# Patient Record
Sex: MALE | Race: WHITE | Employment: OTHER | URBAN - NONMETROPOLITAN AREA
[De-identification: names, ages, dates, MRNs, and addresses within clinical notes are randomized per-mention and may not be internally consistent; named-entity substitution may affect disease eponyms.]

---

## 2017-01-16 ENCOUNTER — HOSPITAL ENCOUNTER (OUTPATIENT)
Dept: PAIN MANAGEMENT | Age: 82
Discharge: HOME | End: 2017-01-16
Payer: COMMERCIAL

## 2017-01-16 DIAGNOSIS — M51.16 LUMBAR DISC DISEASE WITH RADICULOPATHY: ICD-10-CM

## 2017-01-16 PROCEDURE — 62368 ANALYZE SP INF PUMP W/REPROG: CPT

## 2017-02-01 RX ORDER — CARVEDILOL 6.25 MG/1
6.25 TABLET ORAL 2 TIMES DAILY WITH MEALS
Qty: 60 TABLET | Refills: 11 | Status: SHIPPED | OUTPATIENT
Start: 2017-02-01 | End: 2018-02-01

## 2017-02-22 ENCOUNTER — HOSPITAL ENCOUNTER (OUTPATIENT)
Dept: PAIN MANAGEMENT | Age: 82
Discharge: HOME | End: 2017-02-22
Payer: COMMERCIAL

## 2017-02-22 DIAGNOSIS — M51.16 LUMBAR DISC DISEASE WITH RADICULOPATHY: ICD-10-CM

## 2017-02-22 PROCEDURE — 6360000002 HC RX W HCPCS

## 2017-02-22 PROCEDURE — 2500000003 HC RX 250 WO HCPCS

## 2017-02-22 PROCEDURE — 62369 ANAL SP INF PMP W/REPRG&FILL: CPT

## 2017-03-01 ENCOUNTER — OFFICE VISIT (OUTPATIENT)
Dept: CARDIOLOGY | Facility: CLINIC | Age: 82
End: 2017-03-01

## 2017-03-01 VITALS
OXYGEN SATURATION: 96 % | DIASTOLIC BLOOD PRESSURE: 60 MMHG | HEART RATE: 65 BPM | BODY MASS INDEX: 26.9 KG/M2 | SYSTOLIC BLOOD PRESSURE: 138 MMHG | WEIGHT: 203 LBS | HEIGHT: 73 IN

## 2017-03-01 DIAGNOSIS — G47.33 OBSTRUCTIVE SLEEP APNEA SYNDROME: ICD-10-CM

## 2017-03-01 DIAGNOSIS — I10 ESSENTIAL HYPERTENSION: Primary | ICD-10-CM

## 2017-03-01 DIAGNOSIS — E78.2 MIXED HYPERLIPIDEMIA: ICD-10-CM

## 2017-03-01 DIAGNOSIS — I25.10 CORONARY ARTERY DISEASE INVOLVING NATIVE CORONARY ARTERY OF NATIVE HEART WITHOUT ANGINA PECTORIS: ICD-10-CM

## 2017-03-01 DIAGNOSIS — R60.0 LOCALIZED EDEMA: ICD-10-CM

## 2017-03-01 PROCEDURE — 99214 OFFICE O/P EST MOD 30 MIN: CPT | Performed by: INTERNAL MEDICINE

## 2017-03-01 RX ORDER — NIFEDIPINE 60 MG/1
120 TABLET, FILM COATED, EXTENDED RELEASE ORAL DAILY
Qty: 60 TABLET | Refills: 11 | Status: SHIPPED | OUTPATIENT
Start: 2017-03-01

## 2017-03-01 RX ORDER — DULOXETIN HYDROCHLORIDE 60 MG/1
60 CAPSULE, DELAYED RELEASE ORAL DAILY
COMMUNITY
End: 2017-04-12 | Stop reason: SDUPTHER

## 2017-03-01 NOTE — PROGRESS NOTES
Reason for Visit: cardiovascular follow up.    HPI:  Gilberto Ernst is a 81 y.o. male is here today for 3 month follow-up.  He brings in his blood pressure log.  His average is 153/78.  He has been having a lot of musculoskeletal pain and was recently seen in pain clinic.  His dilaudid was increased but he feels the side effects are intolerable.  He denies any chest pain.  He is not very active secondary to his chronic pain issues.  He continues to have persistent lower extremity edema.    Previous Cardiac Testing and Procedures:  - Echo (06/14/2011) EF 65% diastolic dysfunction, mild mitral and tricuspid regurgitation  - Lipid panel (07/03/2015) total cholesterol 138, HDL 43, LDL 78, triglycerides 85  - Lexiscan SPECT (02/10/2014) negative for ischemia  - PCI to LAD 2002  - PCI to OM and distal LCx March 2014 with bare metal stent    Patient Active Problem List   Diagnosis   • CAD in native artery   • Hyperlipidemia   • Benign essential hypertension   • Stroke   • Sleep apnea   • Parkinson's disease   • Chronic pain       Social History   Substance Use Topics   • Smoking status: Former Smoker     Packs/day: 1.00     Years: 20.00   • Smokeless tobacco: Never Used   • Alcohol use Yes      Comment: occasional alcohol use, rare beer or glass of wine       Family History   Problem Relation Age of Onset   • Cancer Mother      bladder   • Asthma Mother    • Colon cancer Father    • Heart attack Father        The following portions of the patient's history were reviewed and updated as appropriate: allergies, current medications, past family history, past medical history, past social history, past surgical history and problem list.      Current Outpatient Prescriptions:   •  ASPIRIN BUF,[id number], PO, Take  by mouth daily. 81mg tablet DR, oral daily, Disp: , Rfl:   •  atorvastatin (LIPITOR) 20 MG tablet, Take 1 tablet by mouth Every Night., Disp: 30 tablet, Rfl: 11  •  Calcium Carbonate (CALCIUM 600 PO), Take 1  dose by mouth 3 (three) times a day., Disp: , Rfl:   •  calcium citrate-vitamin d (CALCITRATE) 315-250 MG-UNIT tablet tablet, Take 1 tablet by mouth 3 times daily (with meals), Disp: , Rfl:   •  carbidopa-levodopa-entacapone (STALEVO) 37.5-150-200 MG per tablet, Take 1 tablet by mouth 3 (three) times a day., Disp: , Rfl:   •  carvedilol (COREG) 6.25 MG tablet, Take 1 tablet by mouth 2 (Two) Times a Day With Meals. Hold for HR less than 50, Disp: 60 tablet, Rfl: 11  •  Cholecalciferol (VITAMIN D PO), 1,000 unit marking on U-100 syringe daily. Vitamin D 1000unit, 1 daily, Disp: , Rfl:   •  clopidogrel (PLAVIX) 75 MG tablet, Take 1 tablet by mouth Daily., Disp: 30 tablet, Rfl: 11  •  DULoxetine (CYMBALTA) 60 MG capsule, Take 60 mg by mouth daily., Disp: , Rfl:   •  DULoxetine (CYMBALTA) 60 MG capsule, Take 60 mg by mouth Daily., Disp: , Rfl:   •  Fentanyl-Bupivacaine-NaCl 3-0.125-0.9 MCG/ML-%-% solution, by Epidural route., Disp: , Rfl:   •  furosemide (LASIX) 40 MG tablet, Take 40 mg by mouth daily., Disp: , Rfl:   •  HYDROcodone-acetaminophen (NORCO)  MG per tablet, Take 1 tablet by mouth 2 (two) times a day. 1 oral two- three times daily, Disp: , Rfl:   •  HYDROmorphone (DILAUDID) 2 MG tablet, take 1 tablet by mouth every 4 hours if needed for pain (1-2 TABS AS NEEDED. TRY TO TAMPER OFF.), Disp: , Rfl: 0  •  HYDROmorphone HCl (DILAUDID IJ), Infuse intravenously continuous Indications: morphine pump, Disp: , Rfl:   •  lisinopril (PRINIVIL,ZESTRIL) 20 MG tablet, Take 1 tablet by mouth 2 (Two) Times a Day., Disp: 60 tablet, Rfl: 11  •  NIFEdipine XL (PROCARDIA XL) 90 MG 24 hr tablet, Take 90 mg by mouth daily., Disp: , Rfl:   •  nitroglycerin (NITROSTAT) 0.4 MG SL tablet, Place 0.4 mg under the tongue. 1 Tab Sublingual take as directed, Disp: , Rfl:   •  potassium chloride (K-DUR,KLOR-CON) 10 MEQ CR tablet, Take 10 mEq by mouth 3 (three) times a day., Disp: , Rfl:   •  ranitidine (ZANTAC) 150 MG tablet, Take 150  "mg by mouth daily., Disp: , Rfl:     Review of Systems   Constitution: Positive for malaise/fatigue. Negative for chills, decreased appetite and fever.   HENT: Negative for congestion, headaches and nosebleeds.    Eyes: Negative for blurred vision and double vision.   Cardiovascular: Positive for leg swelling. Negative for chest pain, irregular heartbeat, palpitations and syncope.   Respiratory: Positive for shortness of breath. Negative for cough and wheezing.    Endocrine: Negative for cold intolerance and heat intolerance.   Hematologic/Lymphatic: Bruises/bleeds easily.   Skin: Positive for dry skin. Negative for rash.   Musculoskeletal: Positive for back pain. Negative for joint pain, muscle cramps, neck pain and stiffness.   Gastrointestinal: Negative for abdominal pain, constipation, diarrhea, heartburn, melena, nausea and vomiting.   Genitourinary: Negative for hematuria and nocturia.   Neurological: Positive for loss of balance (occ). Negative for dizziness, light-headedness and numbness.   Psychiatric/Behavioral: Positive for depression. The patient does not have insomnia and is not nervous/anxious.      Answers for HPI/ROS submitted by the patient on 2/28/2017   Hypertension  Chronicity: chronic  Onset: more than 1 year ago  Progression since onset: waxing and waning  Condition status: resistant  peripheral edema: Yes  CAD risks: dyslipidemia, sedentary lifestyle  Compliance problems: exercise, medication side effects        Objective   Visit Vitals   • /60 (BP Location: Left arm, Patient Position: Sitting, Cuff Size: Adult)   • Pulse 65   • Ht 73\" (185.4 cm)   • Wt 203 lb (92.1 kg)   • SpO2 96%   • BMI 26.78 kg/m2     Physical Exam   Constitutional: He is oriented to person, place, and time. He appears well-developed and well-nourished.   HENT:   Head: Normocephalic and atraumatic.   Cardiovascular: Normal rate, regular rhythm and normal heart sounds.    No murmur heard.  Pulmonary/Chest: Effort " normal and breath sounds normal.   Musculoskeletal: He exhibits edema (mild to moderate).   Neurological: He is alert and oriented to person, place, and time.   Skin: Skin is warm and dry.   Psychiatric: He has a normal mood and affect.     Procedures      ICD-10-CM ICD-9-CM   1. Essential hypertension I10 401.9   2. Coronary artery disease involving native coronary artery of native heart without angina pectoris I25.10 414.01   3. Mixed hyperlipidemia E78.2 272.2   4. Obstructive sleep apnea syndrome G47.33 327.23         Assessment/Plan:  1. Hypertension: Reasonably well controlled today but his average SBP at home is 153 mmHg.  Will therefore increase nifedipine to 120 mg daily.  If further blood pressure control as needed, then a thiazide diuretic would likely be next, particularly given that he has some lower extremity edema.     2. CAD: PCI to LAD in 2002 and LCx distribution in March 2013. On good medical therapy with aspirin, carvedilol, and atorvastatin. Remains asymptomatic at this time.      3. Hyperlipidemia: Managed on atorvastatin.     4. Sleep apnea: Compliant with CPAP and likely contributes to look pressure fluctuations.    5.  Edema: Likely secondary to chronic venous insufficiency.  Nifedipine may be contributing.  He takes furosemide 40 mg daily.  Encouraged use of compression stockings and low sodium diet.  Becomes intolerable may need to consider alternative blood pressure control other than his calcium channel blocker.

## 2017-03-30 ENCOUNTER — HOSPITAL ENCOUNTER (OUTPATIENT)
Dept: PAIN MANAGEMENT | Age: 82
Discharge: HOME | End: 2017-03-30
Payer: MEDICARE

## 2017-03-30 VITALS
SYSTOLIC BLOOD PRESSURE: 121 MMHG | OXYGEN SATURATION: 95 % | DIASTOLIC BLOOD PRESSURE: 62 MMHG | RESPIRATION RATE: 16 BRPM | HEART RATE: 67 BPM | TEMPERATURE: 97.6 F

## 2017-03-30 DIAGNOSIS — M51.16 LUMBAR DISC DISEASE WITH RADICULOPATHY: Chronic | ICD-10-CM

## 2017-03-30 DIAGNOSIS — M51.36 LUMBAR DEGENERATIVE DISC DISEASE: ICD-10-CM

## 2017-03-30 PROCEDURE — 3209999900 FLUORO FOR SURGICAL PROCEDURES

## 2017-03-30 PROCEDURE — 61070 BRAIN CANAL SHUNT PROCEDURE: CPT

## 2017-03-30 ASSESSMENT — PAIN - FUNCTIONAL ASSESSMENT: PAIN_FUNCTIONAL_ASSESSMENT: 0-10

## 2017-03-30 ASSESSMENT — PAIN DESCRIPTION - DESCRIPTORS: DESCRIPTORS: ACHING;PRESSURE;NAGGING

## 2017-03-30 ASSESSMENT — ACTIVITIES OF DAILY LIVING (ADL): EFFECT OF PAIN ON DAILY ACTIVITIES: WALKING INCREASES PAIN

## 2017-04-03 ENCOUNTER — DOCUMENTATION (OUTPATIENT)
Dept: CARDIOLOGY | Facility: CLINIC | Age: 82
End: 2017-04-03

## 2017-04-03 RX ORDER — FUROSEMIDE 40 MG/1
40 TABLET ORAL 2 TIMES DAILY
Qty: 60 TABLET | Refills: 11 | Status: SHIPPED | OUTPATIENT
Start: 2017-04-03 | End: 2018-04-03

## 2017-04-10 ENCOUNTER — HOSPITAL ENCOUNTER (OUTPATIENT)
Dept: PAIN MANAGEMENT | Age: 82
Discharge: HOME | End: 2017-04-10
Payer: MEDICARE

## 2017-04-10 DIAGNOSIS — M51.16 LUMBAR DISC DISEASE WITH RADICULOPATHY: ICD-10-CM

## 2017-04-10 PROCEDURE — 62369 ANAL SP INF PMP W/REPRG&FILL: CPT

## 2017-04-10 PROCEDURE — 2500000003 HC RX 250 WO HCPCS

## 2017-04-10 PROCEDURE — 6360000002 HC RX W HCPCS

## 2017-04-12 ENCOUNTER — OFFICE VISIT (OUTPATIENT)
Dept: NEUROSURGERY | Facility: CLINIC | Age: 82
End: 2017-04-12

## 2017-04-12 VITALS
HEIGHT: 73 IN | WEIGHT: 200 LBS | BODY MASS INDEX: 26.51 KG/M2 | SYSTOLIC BLOOD PRESSURE: 130 MMHG | DIASTOLIC BLOOD PRESSURE: 78 MMHG

## 2017-04-12 DIAGNOSIS — E66.09 NON MORBID OBESITY DUE TO EXCESS CALORIES: ICD-10-CM

## 2017-04-12 DIAGNOSIS — Z78.9 TOBACCO NON-USER: ICD-10-CM

## 2017-04-12 DIAGNOSIS — M51.36 DEGENERATION OF LUMBAR INTERVERTEBRAL DISC: ICD-10-CM

## 2017-04-12 DIAGNOSIS — G89.4 CHRONIC PAIN SYNDROME: ICD-10-CM

## 2017-04-12 DIAGNOSIS — T85.610A MALFUNCTION OF INTRATHECAL INFUSION PUMP, INITIAL ENCOUNTER: Primary | ICD-10-CM

## 2017-04-12 PROCEDURE — 99205 OFFICE O/P NEW HI 60 MIN: CPT | Performed by: NURSE PRACTITIONER

## 2017-04-12 RX ORDER — ASPIRIN 81 MG/1
81 TABLET ORAL DAILY
COMMUNITY

## 2017-04-12 NOTE — PROGRESS NOTES
"Neurosurgery Initial Patient Visit    Patient: Gilberto Ernst  : 1935    Primary Care Provider: Jorge Felix MD    Requesting Provider: Chris Cotter MD      History    Chief Complaint:   Chief Complaint   Patient presents with   • Surgical Discussion     Here for possible revision of pain pump. He has a pain pump placed by Dr. Charles Zhang in . He has never recieved much relief from this. He has had multiple lumbar surgeries. His pump was just refilled on 04/10/17.        History of Present Illness: He is a 81-year-old  male who presents with chief complaint of lower back pain.  He is referred by Dr. Cotter, and we have been asked to see the patient in consultation for further evaluation.     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  He has had multiple surgical procedures since the age of 19 including excision of spinal tumor on 4 separate occasions.  He has also had a lumbar fusion and ultimately placement of a pain pump by Dr. Zhang in .  The patient states in  that the catheter required revision and probable replacement as it had become \"looped.\"  He states that he got very good relief with the pain pump trial, but he has never felt good pain relief since the actual pump was placed.  He relates that Dr. Cotter has tried multiple medications in the pump.  More recently they have tried titrating his medication upwards, but he has still not gotten any improvement in his pain.  A recent dye study has shown no flow through the catheter.  Thus, he has been referred here to discuss surgical options including possible revision of the system.    His primary complaint is of pain and the transverse lower back.  This radiates toward the bilateral hips and here he points to the lateral trochanters.  He does not describe any type of radicular feature into either lower " extremity.    Allergies:   Penicillins and Tape, Intrathecal Fentanyl    Past Medical History:    Past Medical History:   Diagnosis Date   • Bradycardia    • Coronary artery disease    • GERD (gastroesophageal reflux disease)    • Hyperlipidemia    • Hypertension    • Parkinson's disease    • Prostate cancer    • Sleep apnea    • Spinal cord tumor     NONE CANCEROUS   • Stroke    • TIA (transient ischemic attack) 2007       Past Surgical History:   Past Surgical History:   Procedure Laterality Date   • CORONARY ANGIOPLASTY WITH STENT PLACEMENT  2002, 2014    Performed by Dr. Mike Chan   • ELBOW PROCEDURE  08/2005    Elbow bursectomy by Dr. Wick at [facility]   • KNEE ARTHROSCOPY Right 03/1988    Performed by Dr. Mitesh Medrano at [facility]   • LUMBAR FUSION  06/2011, 07/2011    L3-4, L4-5 by Dr. Zhang    • LUMBAR LAMINECTOMY  2011    L3-4, L4-5 by Dr. Charles Zhang   • PAIN PUMP INSERTION/REVISION  07/2013    Inserted by Dr. Charles Zhang   • PROSTATECTOMY  11/1992    Perfored by Dr. Rothman at [facility]   • TUMOR EXCISION  1954, 1961, 1963    Spinal Tumor - performed by multiple providers       Medications: Aspirin, Coreg, Procardia XL, Zestril, Potassium, Lipitor, Plavix, Cymbalta, Norco, Stalevo, Naproxen, Ranitidine, Dilaudid (intrathecally) and Bupivacaine (intrathecally)       Social History:   reports that he has quit smoking. He has never used smokeless tobacco. He reports that he drinks alcohol. He reports that he does not use illicit drugs.  He is  and his wife accompanies him.  They live in [place].      Family History:    Family History   Problem Relation Age of Onset   • Asthma Mother    • Cancer Mother    • Colon cancer Father    • Heart attack Father        Review of Systems:  Review of Systems   Constitutional: Negative for chills, fever and unexpected weight change.        Overall, he does not feel he is in the best of  health.   HENT: Positive for hearing loss. Negative for congestion, rhinorrhea, sore throat and tinnitus.    Eyes: Negative for photophobia and visual disturbance.        He has reading glasses.   Respiratory: Positive for shortness of breath. Negative for cough and wheezing.    Cardiovascular: Negative for chest pain and palpitations.   Gastrointestinal: Negative for abdominal pain, constipation, diarrhea, nausea and vomiting.   Genitourinary: Positive for frequency. Negative for difficulty urinating and dysuria.   Musculoskeletal: Positive for arthralgias, back pain and myalgias. Negative for gait problem and neck pain.   Skin: Negative for rash.   Allergic/Immunologic: Negative for environmental allergies.   Neurological: Positive for weakness and numbness. Negative for seizures and headaches.        He has history of TIA.   Hematological: Does not bruise/bleed easily.   Psychiatric/Behavioral: Negative for confusion. The patient is not nervous/anxious.         He relates depression.   All other systems reviewed and are negative.          Neurological Physical Examination    Physical Exam   Constitutional: He is oriented to person, place, and time. He appears well-developed and well-nourished. No distress.   He is pleasant and cooperative in no acute distress.  He tends to move about the exam room and to change positions quite frequently.   HENT:   Head: Normocephalic and atraumatic.   Eyes: No scleral icterus.   Neck: Neck supple. No tracheal deviation and normal range of motion present.   Cardiovascular: Normal rate, regular rhythm and normal heart sounds.    No murmur heard.  Pulmonary/Chest: Effort normal and breath sounds normal. No respiratory distress. He has no wheezes.   Musculoskeletal:   Straight leg raising is done reasonably well bilaterally.  Deep tendon reflexes approximately 1+ patellar approaching 1+ at the ankles bit difficult to reproduce.  He has fairly good symmetric strength of the lower  extremities.   Neurological: He is alert and oriented to person, place, and time. He displays normal reflexes. No cranial nerve deficit.   Optic discs not visualized.   Skin: Skin is warm and dry. No rash noted.   He has multiple, well-healed incisions to the lumbar spine.  His pump site is located in the left lower abdomen where there is also a well-healed incision.   Psychiatric: He has a normal mood and affect. His speech is normal and behavior is normal. Cognition and memory are normal.   Vitals reviewed.         Medical Decision Making    Management Options:  In the office we have talked at length about possible decisions and approaches to his issue.  Answering several questions, I felt that he was ready to discuss sizing and replacing the entire system as his dye study has shown no flow and the pump has been ineffective to treat his pain.  His recent printout from Dr. Cotter's office shows that his MELODY is at 35 months, and likely we would also place a new pump as well.  The patient did however continue to be reluctant, with the option of removing the system altogether still in mind.    The case was discussed with Dr. Bergman and he also saw and evaluated the patient.  He also spent quite a bit of time talking about possible treatment options.  After a lengthy discussion, the patient, his wife, and Dr. Bergman agreed that they wished to wean and gradually decrease medication in the pain pump and possibly even replace it with saline at some point.  Then, he might wish to have the system removed altogether or if he simply could not tolerate his pain on oral medications we will talk about revising the system.  The dye study has shown that there was no flow through the catheter, however, the patient feels that sometimes he does get better pain relief than at others, leading him to believe that he is still receiving some medication to the pump or perhaps is being absorbed subcutaneously.  At any rate, they are not ready  today to schedule any type of procedure.  He will work with Dr. Cotter with the plan to decrease the medication in the pump and determine if at some point he wishes to have it removed.  We will be on standby until the patient has made a decision.  They are agreeable with this plan.    I attest that this patient encounter including review of history, medical records, physical examination, and discussion of medical decision making and planning was at least 60 minutes in duration.    Gilberto was seen today for surgical discussion.    Diagnoses and all orders for this visit:    Malfunction of intrathecal infusion pump, initial encounter    Degeneration of lumbar intervertebral disc    Chronic pain syndrome    Non morbid obesity due to excess calories    Tobacco non-user        Thank you, Chris Cotter MD for this Consultation and the opportunity to participate in the care of this pleasant patient.

## 2017-04-19 ENCOUNTER — HOSPITAL ENCOUNTER (OUTPATIENT)
Dept: PAIN MANAGEMENT | Age: 82
Discharge: HOME | End: 2017-04-19
Payer: MEDICARE

## 2017-04-19 DIAGNOSIS — M51.16 LUMBAR DISC DISEASE WITH RADICULOPATHY: ICD-10-CM

## 2017-04-19 PROCEDURE — 62368 ANALYZE SP INF PUMP W/REPROG: CPT

## 2017-04-19 RX ORDER — HYDROCODONE BITARTRATE AND ACETAMINOPHEN 10; 325 MG/1; MG/1
1 TABLET ORAL EVERY 4 HOURS PRN
Qty: 180 TABLET | Refills: 0 | Status: SHIPPED | OUTPATIENT
Start: 2017-04-29 | End: 2017-05-10

## 2017-05-10 ENCOUNTER — HOSPITAL ENCOUNTER (OUTPATIENT)
Dept: PAIN MANAGEMENT | Age: 82
Discharge: HOME | End: 2017-05-10
Payer: MEDICARE

## 2017-05-10 DIAGNOSIS — M51.16 LUMBAR DISC DISEASE WITH RADICULOPATHY: ICD-10-CM

## 2017-05-10 PROCEDURE — 62368 ANALYZE SP INF PUMP W/REPROG: CPT

## 2017-05-10 RX ORDER — HYDROMORPHONE HYDROCHLORIDE 4 MG/1
4 TABLET ORAL EVERY 6 HOURS PRN
COMMUNITY
End: 2017-05-10 | Stop reason: SDUPTHER

## 2017-05-10 RX ORDER — HYDROMORPHONE HYDROCHLORIDE 4 MG/1
4 TABLET ORAL EVERY 6 HOURS PRN
Qty: 120 TABLET | Refills: 0 | Status: SHIPPED | OUTPATIENT
Start: 2017-05-10 | End: 2017-05-25 | Stop reason: SDUPTHER

## 2017-05-25 ENCOUNTER — HOSPITAL ENCOUNTER (OUTPATIENT)
Dept: PAIN MANAGEMENT | Age: 82
Discharge: HOME | End: 2017-05-25
Payer: MEDICARE

## 2017-05-25 DIAGNOSIS — M51.16 LUMBAR DISC DISEASE WITH RADICULOPATHY: ICD-10-CM

## 2017-05-25 PROCEDURE — 62368 ANALYZE SP INF PUMP W/REPROG: CPT

## 2017-05-25 RX ORDER — HYDROMORPHONE HYDROCHLORIDE 4 MG/1
4 TABLET ORAL EVERY 6 HOURS PRN
Qty: 120 TABLET | Refills: 0 | Status: SHIPPED | OUTPATIENT
Start: 2017-06-12 | End: 2017-05-25 | Stop reason: SDUPTHER

## 2017-05-25 RX ORDER — HYDROMORPHONE HYDROCHLORIDE 4 MG/1
4 TABLET ORAL EVERY 6 HOURS PRN
Qty: 120 TABLET | Refills: 0 | Status: SHIPPED | OUTPATIENT
Start: 2017-06-12 | End: 2017-07-07 | Stop reason: SDUPTHER

## 2017-06-01 ENCOUNTER — OFFICE VISIT (OUTPATIENT)
Dept: CARDIOLOGY | Facility: CLINIC | Age: 82
End: 2017-06-01

## 2017-06-01 VITALS
BODY MASS INDEX: 25.71 KG/M2 | SYSTOLIC BLOOD PRESSURE: 110 MMHG | DIASTOLIC BLOOD PRESSURE: 62 MMHG | WEIGHT: 194 LBS | OXYGEN SATURATION: 96 % | HEIGHT: 73 IN | HEART RATE: 66 BPM

## 2017-06-01 DIAGNOSIS — E78.2 MIXED HYPERLIPIDEMIA: ICD-10-CM

## 2017-06-01 DIAGNOSIS — G47.33 OBSTRUCTIVE SLEEP APNEA SYNDROME: ICD-10-CM

## 2017-06-01 DIAGNOSIS — I10 BENIGN ESSENTIAL HYPERTENSION: Primary | ICD-10-CM

## 2017-06-01 DIAGNOSIS — I25.10 CAD IN NATIVE ARTERY: ICD-10-CM

## 2017-06-01 PROCEDURE — 99214 OFFICE O/P EST MOD 30 MIN: CPT | Performed by: INTERNAL MEDICINE

## 2017-06-01 NOTE — PROGRESS NOTES
Reason for Visit: cardiovascular follow up.    HPI:  Gilberto Ernst is a 81 y.o. male is here today for follow-up.  His blood pressure has been mildly elevated at home.  Average systolic is 146 mmHg and diastolic is 75 mmHg.  Main complaint is chronic back pain and there has been concern that his pain pump was not working accurately.  He is trying to wean off the pain pump.  He has no current cardiac issues or complaints.      Previous Cardiac Testing and Procedures:  - Echo (06/14/2011) EF 65% diastolic dysfunction, mild mitral and tricuspid regurgitation  - Lipid panel (07/03/2015) total cholesterol 138, HDL 43, LDL 78, triglycerides 85  - Lexiscan SPECT (02/10/2014) negative for ischemia  - PCI to LAD 2002  - PCI to OM and distal LCx March 2014 with bare metal stent    Patient Active Problem List   Diagnosis   • CAD in native artery   • Hyperlipidemia   • Benign essential hypertension   • Stroke   • Sleep apnea   • Parkinson's disease   • Chronic pain   • Malfunction of intrathecal infusion pump   • Degeneration of lumbar intervertebral disc   • Non morbid obesity due to excess calories   • Tobacco non-user       Social History   Substance Use Topics   • Smoking status: Former Smoker   • Smokeless tobacco: Never Used   • Alcohol use Yes       Family History   Problem Relation Age of Onset   • Asthma Mother    • Cancer Mother    • Colon cancer Father    • Heart attack Father        The following portions of the patient's history were reviewed and updated as appropriate: allergies, current medications, past family history, past medical history, past social history, past surgical history and problem list.      Current Outpatient Prescriptions:   •  aspirin 81 MG EC tablet, Take 81 mg by mouth Daily., Disp: , Rfl:   •  atorvastatin (LIPITOR) 20 MG tablet, Take 1 tablet by mouth Every Night., Disp: 30 tablet, Rfl: 11  •  Calcium Carbonate (CALCIUM 600 PO), Take 1 dose by mouth 3 (three) times a day., Disp: , Rfl:    •  calcium citrate-vitamin d (CALCITRATE) 315-250 MG-UNIT tablet tablet, Take 1 tablet by mouth 3 times daily (with meals), Disp: , Rfl:   •  carbidopa-levodopa-entacapone (STALEVO) 37.5-150-200 MG per tablet, Take 1 tablet by mouth 3 (three) times a day., Disp: , Rfl:   •  carvedilol (COREG) 6.25 MG tablet, Take 1 tablet by mouth 2 (Two) Times a Day With Meals. Hold for HR less than 50 (Patient taking differently: Take 6.25 mg by mouth 2 (Two) Times a Day.), Disp: 60 tablet, Rfl: 11  •  Cholecalciferol (VITAMIN D PO), 1,000 unit marking on U-100 syringe daily. Vitamin D 1000unit, 1 daily, Disp: , Rfl:   •  clopidogrel (PLAVIX) 75 MG tablet, Take 1 tablet by mouth Daily., Disp: 30 tablet, Rfl: 11  •  DULoxetine (CYMBALTA) 60 MG capsule, Take 60 mg by mouth daily., Disp: , Rfl:   •  furosemide (LASIX) 40 MG tablet, Take 1 tablet by mouth 2 (Two) Times a Day., Disp: 60 tablet, Rfl: 11  •  HYDROcodone-acetaminophen (NORCO)  MG per tablet, Take 1 tablet by mouth 2 (two) times a day. 1 oral two- three times daily, Disp: , Rfl:   •  HYDROmorphone HCl (DILAUDID IJ), Infuse intravenously continuous Indications: morphine pump, Disp: , Rfl:   •  lisinopril (PRINIVIL,ZESTRIL) 20 MG tablet, Take 1 tablet by mouth 2 (Two) Times a Day., Disp: 60 tablet, Rfl: 11  •  NIFEdipine XL (ADALAT CC) 60 MG 24 hr tablet, Take 2 tablets by mouth Daily., Disp: 60 tablet, Rfl: 11  •  nitroglycerin (NITROSTAT) 0.4 MG SL tablet, Place 0.4 mg under the tongue. 1 Tab Sublingual take as directed, Disp: , Rfl:   •  potassium chloride (K-DUR,KLOR-CON) 10 MEQ CR tablet, Take 10 mEq by mouth 3 (three) times a day., Disp: , Rfl:   •  ranitidine (ZANTAC) 150 MG tablet, Take 150 mg by mouth daily., Disp: , Rfl:     Review of Systems   Constitution: Positive for malaise/fatigue. Negative for chills, decreased appetite and fever.   HENT: Negative for congestion, headaches and nosebleeds.    Eyes: Negative for blurred vision and double vision.  "  Cardiovascular: Positive for leg swelling. Negative for chest pain, irregular heartbeat, palpitations and syncope.   Respiratory: Negative for cough, shortness of breath and wheezing.    Endocrine: Negative for cold intolerance and heat intolerance.   Hematologic/Lymphatic: Bruises/bleeds easily.   Skin: Positive for dry skin. Negative for rash.   Musculoskeletal: Positive for back pain and muscle cramps. Negative for joint pain, neck pain and stiffness.   Gastrointestinal: Negative for abdominal pain, constipation, diarrhea, heartburn, melena, nausea and vomiting.   Genitourinary: Negative for hematuria and nocturia.   Neurological: Positive for loss of balance (occ). Negative for dizziness, light-headedness and numbness.   Psychiatric/Behavioral: Positive for depression and hallucinations. The patient does not have insomnia and is not nervous/anxious.        Objective   /62 (BP Location: Left arm, Patient Position: Sitting, Cuff Size: Large Adult)  Pulse 66  Ht 73\" (185.4 cm)  Wt 194 lb (88 kg)  SpO2 96%  BMI 25.6 kg/m2  Physical Exam   Constitutional: He is oriented to person, place, and time. He appears well-developed and well-nourished.   HENT:   Head: Normocephalic and atraumatic.   Cardiovascular: Normal rate, regular rhythm and normal heart sounds.    No murmur heard.  Pulmonary/Chest: Effort normal and breath sounds normal.   Musculoskeletal: He exhibits no edema.   Neurological: He is alert and oriented to person, place, and time.   Skin: Skin is warm and dry.   Psychiatric: He has a normal mood and affect.     Procedures      ICD-10-CM ICD-9-CM   1. Benign essential hypertension I10 401.1   2. CAD in native artery I25.10 414.01   3. Mixed hyperlipidemia E78.2 272.2   4. Obstructive sleep apnea syndrome G47.33 327.23         Assessment/Plan:  1.  Hypertension: Blood pressure is well controlled on reading from office today.  Mildly elevated at home.  Recommend validation of blood pressure " machine due to the elevated numbers at home.      2.  Coronary artery disease: History of PCI to LAD in 2002, LCx in March 2013.  Continue medical therapy with aspirin, carvedilol, and atorvastatin.    3.  Hyperlipidemia: Continue atorvastatin.    4.  Obstructive sleep apnea: Compliant with CPAP, is interested in a new mask.

## 2017-06-06 ENCOUNTER — TELEPHONE (OUTPATIENT)
Dept: CARDIOLOGY | Facility: CLINIC | Age: 82
End: 2017-06-06

## 2017-06-13 ENCOUNTER — HOSPITAL ENCOUNTER (OUTPATIENT)
Dept: PAIN MANAGEMENT | Age: 82
Discharge: HOME | End: 2017-06-13
Payer: MEDICARE

## 2017-06-13 DIAGNOSIS — M51.16 LUMBAR DISC DISEASE WITH RADICULOPATHY: ICD-10-CM

## 2017-06-13 PROCEDURE — 62368 ANALYZE SP INF PUMP W/REPROG: CPT

## 2017-07-07 ENCOUNTER — HOSPITAL ENCOUNTER (OUTPATIENT)
Dept: PAIN MANAGEMENT | Age: 82
Discharge: HOME | End: 2017-07-07
Payer: MEDICARE

## 2017-07-07 DIAGNOSIS — M51.16 LUMBAR DISC DISEASE WITH RADICULOPATHY: ICD-10-CM

## 2017-07-07 PROCEDURE — 62369 ANAL SP INF PMP W/REPRG&FILL: CPT

## 2017-07-07 RX ORDER — HYDROMORPHONE HYDROCHLORIDE 4 MG/1
4 TABLET ORAL EVERY 6 HOURS PRN
Qty: 120 TABLET | Refills: 0
Start: 2017-07-28

## 2017-07-24 ENCOUNTER — HOSPITAL ENCOUNTER (OUTPATIENT)
Dept: PAIN MANAGEMENT | Age: 82
Discharge: HOME | End: 2017-07-24
Payer: MEDICARE

## 2017-07-24 DIAGNOSIS — M51.16 LUMBAR DISC DISEASE WITH RADICULOPATHY: ICD-10-CM

## 2017-07-24 PROCEDURE — 62368 ANALYZE SP INF PUMP W/REPROG: CPT

## 2017-08-16 ENCOUNTER — HOSPITAL ENCOUNTER (OUTPATIENT)
Dept: PAIN MANAGEMENT | Age: 82
Discharge: HOME | End: 2017-08-16
Payer: MEDICARE

## 2017-08-16 PROCEDURE — 2500000003 HC RX 250 WO HCPCS

## 2017-08-16 PROCEDURE — 62369 ANAL SP INF PMP W/REPRG&FILL: CPT

## 2017-08-16 PROCEDURE — 6360000002 HC RX W HCPCS

## 2017-09-19 ENCOUNTER — HOSPITAL ENCOUNTER (OUTPATIENT)
Dept: PAIN MANAGEMENT | Age: 82
Discharge: HOME | End: 2017-09-19

## 2017-09-19 DIAGNOSIS — M51.16 LUMBAR DISC DISEASE WITH RADICULOPATHY: Primary | Chronic | ICD-10-CM

## 2017-10-02 RX ORDER — CLOPIDOGREL BISULFATE 75 MG/1
75 TABLET ORAL DAILY
Qty: 30 TABLET | Refills: 2 | Status: SHIPPED | OUTPATIENT
Start: 2017-10-02 | End: 2018-01-02 | Stop reason: SDUPTHER

## 2018-01-02 RX ORDER — CLOPIDOGREL BISULFATE 75 MG/1
75 TABLET ORAL DAILY
Qty: 30 TABLET | Refills: 0 | Status: SHIPPED | OUTPATIENT
Start: 2018-01-02 | End: 2019-01-02

## 2023-03-14 NOTE — TELEPHONE ENCOUNTER
CALLED [facility] AND INFORMED IT'S OKAY TO DO THE 90 DAY SUPPLY WITH 3 REFILLS.   
Nando at [facility] contacted me and stated that patient's insurance wants his rx for Lipitor to be a 90 day supply, rather than 30. Nando requested a verbal okay for this or to send a new rx stating it could be a 90 day supply. I told him a 90 day would be fine, but I would verify with medical assistant and call with verbal or send new rx.  
15